# Patient Record
Sex: FEMALE | Race: WHITE | NOT HISPANIC OR LATINO
[De-identification: names, ages, dates, MRNs, and addresses within clinical notes are randomized per-mention and may not be internally consistent; named-entity substitution may affect disease eponyms.]

---

## 2017-01-26 ENCOUNTER — TRANSCRIPTION ENCOUNTER (OUTPATIENT)
Age: 57
End: 2017-01-26

## 2017-11-17 ENCOUNTER — TRANSCRIPTION ENCOUNTER (OUTPATIENT)
Age: 57
End: 2017-11-17

## 2017-11-28 ENCOUNTER — TRANSCRIPTION ENCOUNTER (OUTPATIENT)
Age: 57
End: 2017-11-28

## 2018-03-12 ENCOUNTER — TRANSCRIPTION ENCOUNTER (OUTPATIENT)
Age: 58
End: 2018-03-12

## 2018-04-15 ENCOUNTER — EMERGENCY (EMERGENCY)
Facility: HOSPITAL | Age: 58
LOS: 0 days | Discharge: HOME | End: 2018-04-15
Attending: EMERGENCY MEDICINE

## 2018-04-15 VITALS
SYSTOLIC BLOOD PRESSURE: 132 MMHG | RESPIRATION RATE: 18 BRPM | OXYGEN SATURATION: 99 % | TEMPERATURE: 96 F | DIASTOLIC BLOOD PRESSURE: 69 MMHG | HEART RATE: 42 BPM

## 2018-04-15 VITALS
RESPIRATION RATE: 20 BRPM | OXYGEN SATURATION: 99 % | HEART RATE: 52 BPM | TEMPERATURE: 96 F | DIASTOLIC BLOOD PRESSURE: 72 MMHG | SYSTOLIC BLOOD PRESSURE: 148 MMHG

## 2018-04-15 DIAGNOSIS — R10.9 UNSPECIFIED ABDOMINAL PAIN: ICD-10-CM

## 2018-04-15 DIAGNOSIS — Z90.89 ACQUIRED ABSENCE OF OTHER ORGANS: ICD-10-CM

## 2018-04-15 DIAGNOSIS — R10.33 PERIUMBILICAL PAIN: ICD-10-CM

## 2018-04-15 DIAGNOSIS — F41.9 ANXIETY DISORDER, UNSPECIFIED: ICD-10-CM

## 2018-04-15 DIAGNOSIS — K29.80 DUODENITIS WITHOUT BLEEDING: ICD-10-CM

## 2018-04-15 LAB
ALBUMIN SERPL ELPH-MCNC: 4.5 G/DL — SIGNIFICANT CHANGE UP (ref 3.5–5.2)
ALP SERPL-CCNC: 107 U/L — SIGNIFICANT CHANGE UP (ref 30–115)
ALT FLD-CCNC: 11 U/L — SIGNIFICANT CHANGE UP (ref 0–41)
ANION GAP SERPL CALC-SCNC: 12 MMOL/L — SIGNIFICANT CHANGE UP (ref 7–14)
AST SERPL-CCNC: 11 U/L — SIGNIFICANT CHANGE UP (ref 0–41)
BILIRUB SERPL-MCNC: 0.6 MG/DL — SIGNIFICANT CHANGE UP (ref 0.2–1.2)
BUN SERPL-MCNC: 16 MG/DL — SIGNIFICANT CHANGE UP (ref 10–20)
CALCIUM SERPL-MCNC: 9.3 MG/DL — SIGNIFICANT CHANGE UP (ref 8.5–10.1)
CHLORIDE SERPL-SCNC: 105 MMOL/L — SIGNIFICANT CHANGE UP (ref 98–110)
CO2 SERPL-SCNC: 27 MMOL/L — SIGNIFICANT CHANGE UP (ref 17–32)
CREAT SERPL-MCNC: 0.6 MG/DL — LOW (ref 0.7–1.5)
GAS PNL BLDV: SIGNIFICANT CHANGE UP
GLUCOSE SERPL-MCNC: 95 MG/DL — SIGNIFICANT CHANGE UP (ref 70–99)
HCT VFR BLD CALC: 42.8 % — SIGNIFICANT CHANGE UP (ref 37–47)
HGB BLD-MCNC: 14 G/DL — SIGNIFICANT CHANGE UP (ref 12–16)
LIDOCAIN IGE QN: 21 U/L — SIGNIFICANT CHANGE UP (ref 7–60)
MCHC RBC-ENTMCNC: 27.6 PG — SIGNIFICANT CHANGE UP (ref 27–31)
MCHC RBC-ENTMCNC: 32.7 G/DL — SIGNIFICANT CHANGE UP (ref 32–37)
MCV RBC AUTO: 84.4 FL — SIGNIFICANT CHANGE UP (ref 81–99)
NRBC # BLD: 0 /100 WBCS — SIGNIFICANT CHANGE UP (ref 0–0)
PLATELET # BLD AUTO: 327 K/UL — SIGNIFICANT CHANGE UP (ref 130–400)
POTASSIUM SERPL-MCNC: 4.7 MMOL/L — SIGNIFICANT CHANGE UP (ref 3.5–5)
POTASSIUM SERPL-SCNC: 4.7 MMOL/L — SIGNIFICANT CHANGE UP (ref 3.5–5)
PROT SERPL-MCNC: 7.1 G/DL — SIGNIFICANT CHANGE UP (ref 6–8)
RBC # BLD: 5.07 M/UL — SIGNIFICANT CHANGE UP (ref 4.2–5.4)
RBC # FLD: 12.9 % — SIGNIFICANT CHANGE UP (ref 11.5–14.5)
SODIUM SERPL-SCNC: 144 MMOL/L — SIGNIFICANT CHANGE UP (ref 135–146)
TROPONIN T SERPL-MCNC: <0.01 NG/ML — SIGNIFICANT CHANGE UP
WBC # BLD: 7.44 K/UL — SIGNIFICANT CHANGE UP (ref 4.8–10.8)
WBC # FLD AUTO: 7.44 K/UL — SIGNIFICANT CHANGE UP (ref 4.8–10.8)

## 2018-04-15 RX ORDER — PANTOPRAZOLE SODIUM 20 MG/1
1 TABLET, DELAYED RELEASE ORAL
Qty: 14 | Refills: 0
Start: 2018-04-15 | End: 2018-04-28

## 2018-04-15 RX ORDER — SODIUM CHLORIDE 9 MG/ML
1000 INJECTION INTRAMUSCULAR; INTRAVENOUS; SUBCUTANEOUS ONCE
Qty: 0 | Refills: 0 | Status: COMPLETED | OUTPATIENT
Start: 2018-04-15 | End: 2018-04-15

## 2018-04-15 RX ORDER — MORPHINE SULFATE 50 MG/1
4 CAPSULE, EXTENDED RELEASE ORAL ONCE
Qty: 0 | Refills: 0 | Status: DISCONTINUED | OUTPATIENT
Start: 2018-04-15 | End: 2018-04-15

## 2018-04-15 RX ORDER — FAMOTIDINE 10 MG/ML
20 INJECTION INTRAVENOUS ONCE
Qty: 0 | Refills: 0 | Status: DISCONTINUED | OUTPATIENT
Start: 2018-04-15 | End: 2018-04-15

## 2018-04-15 RX ORDER — FAMOTIDINE 10 MG/ML
20 INJECTION INTRAVENOUS ONCE
Qty: 0 | Refills: 0 | Status: COMPLETED | OUTPATIENT
Start: 2018-04-15 | End: 2018-04-15

## 2018-04-15 RX ORDER — METRONIDAZOLE 500 MG
500 TABLET ORAL ONCE
Qty: 0 | Refills: 0 | Status: COMPLETED | OUTPATIENT
Start: 2018-04-15 | End: 2018-04-15

## 2018-04-15 RX ORDER — ONDANSETRON 8 MG/1
4 TABLET, FILM COATED ORAL ONCE
Qty: 0 | Refills: 0 | Status: COMPLETED | OUTPATIENT
Start: 2018-04-15 | End: 2018-04-15

## 2018-04-15 RX ORDER — CIPROFLOXACIN LACTATE 400MG/40ML
400 VIAL (ML) INTRAVENOUS ONCE
Qty: 0 | Refills: 0 | Status: COMPLETED | OUTPATIENT
Start: 2018-04-15 | End: 2018-04-15

## 2018-04-15 RX ADMIN — SODIUM CHLORIDE 1000 MILLILITER(S): 9 INJECTION INTRAMUSCULAR; INTRAVENOUS; SUBCUTANEOUS at 13:58

## 2018-04-15 RX ADMIN — ONDANSETRON 4 MILLIGRAM(S): 8 TABLET, FILM COATED ORAL at 13:58

## 2018-04-15 RX ADMIN — MORPHINE SULFATE 4 MILLIGRAM(S): 50 CAPSULE, EXTENDED RELEASE ORAL at 13:58

## 2018-04-15 RX ADMIN — FAMOTIDINE 20 MILLIGRAM(S): 10 INJECTION INTRAVENOUS at 16:14

## 2018-04-15 RX ADMIN — Medication 200 MILLIGRAM(S): at 19:48

## 2018-04-15 RX ADMIN — Medication 100 MILLIGRAM(S): at 19:48

## 2018-04-15 NOTE — ED ADULT NURSE NOTE - RN DISCHARGE SIGNATURE
Problem: Patient Care Overview  Goal: Plan of Care Review  Outcome: Ongoing (interventions implemented as appropriate)  Patient resting in bed. Pain well controlled with epidural anesthesia. Membranes intact. Family at bedside.       15-Apr-2018

## 2018-04-15 NOTE — ED PROVIDER NOTE - PHYSICAL EXAMINATION
Well appearing NAD non toxic. NCAT EOMI conjunctiva nml. No nasal discharge. MMM. Neck supple, non tender, full ROM. RRR no MRG +S1S2. CTA b/l. Abd s +periumbilical ttp no rebound no guarding ND +BS. Ext WWP x4, moving all extremities, no edema. 2+ equal pulses throughout. Cooperative, appropriate. CN2-12 grossly intact no sensory or motor deficits throughout, no no drift, rapid alternating wnl, no ataxia, neg romberg.

## 2018-04-15 NOTE — ED PROVIDER NOTE - ATTENDING CONTRIBUTION TO CARE
56 y/o F pmh as noted, p/w epigastric pain, intermittent x 6d, w/ some vom and loose stool.  Pain worse this AM so came to ED.  NO melany cp, sob, back pain.  No hx dvt/ PE/ MI.  No prior abd surgery.  PE: NAD; NCAT; CTAB; RRR; abd soft + ttp epigastrum, no r/g; no cvat; skin warm dry; no pedal edema.  IMP: hepato-mario alberto vs gastroenteritis; acs/ thoracic pathology seems unlikely.  P: labs, ekg, cxr, CT abd, analgesia/ GI meds, reassess.

## 2018-04-15 NOTE — ED PROVIDER NOTE - OBJECTIVE STATEMENT
56yo F hx thyroidectomy anxiety otherwise healthy p/w abdominal pain x6d- intermittent, mid abdominal- non radiating- this AM, pain became constant, sig worse, +nausea, +loose stools- no f/c/v, chest pain, shortness of breath, dysuria/hematuria, back pain, numbness/weakness, hx abdominal surgeries

## 2018-04-15 NOTE — ED PROVIDER NOTE - CARE PLAN
Principal Discharge DX:	Duodenitis Principal Discharge DX:	Duodenitis  Secondary Diagnosis:	Abdominal pain

## 2018-04-15 NOTE — ED PROVIDER NOTE - PROGRESS NOTE DETAILS
Labs, imaging reviewed. Discussed results with patient. Will give pt rx for protonix and GI followup. Pt signed out to me pending completion of work up.  Pt  presenting with intermittent mid abd pain x6 days.  No a/w fever chills CP dysuria.  On Exam:  NAD.  Tolerating PO.  CT showing mild duodenitis.  Labs unremarkable will DC to follow with GI and give PPI.

## 2018-05-09 ENCOUNTER — EMERGENCY (EMERGENCY)
Facility: HOSPITAL | Age: 58
LOS: 0 days | Discharge: HOME | End: 2018-05-09
Attending: EMERGENCY MEDICINE | Admitting: EMERGENCY MEDICINE

## 2018-05-09 VITALS
DIASTOLIC BLOOD PRESSURE: 66 MMHG | OXYGEN SATURATION: 96 % | HEART RATE: 51 BPM | TEMPERATURE: 96 F | SYSTOLIC BLOOD PRESSURE: 138 MMHG | RESPIRATION RATE: 18 BRPM

## 2018-05-09 DIAGNOSIS — R10.13 EPIGASTRIC PAIN: ICD-10-CM

## 2018-05-09 DIAGNOSIS — K29.80 DUODENITIS WITHOUT BLEEDING: ICD-10-CM

## 2018-05-09 DIAGNOSIS — Z79.899 OTHER LONG TERM (CURRENT) DRUG THERAPY: ICD-10-CM

## 2018-05-09 DIAGNOSIS — E89.0 POSTPROCEDURAL HYPOTHYROIDISM: Chronic | ICD-10-CM

## 2018-05-09 DIAGNOSIS — Z87.891 PERSONAL HISTORY OF NICOTINE DEPENDENCE: ICD-10-CM

## 2018-05-09 LAB
ALBUMIN SERPL ELPH-MCNC: 4.5 G/DL — SIGNIFICANT CHANGE UP (ref 3.5–5.2)
ALP SERPL-CCNC: 90 U/L — SIGNIFICANT CHANGE UP (ref 30–115)
ALT FLD-CCNC: 13 U/L — SIGNIFICANT CHANGE UP (ref 0–41)
ANION GAP SERPL CALC-SCNC: 16 MMOL/L — HIGH (ref 7–14)
APPEARANCE UR: (no result)
AST SERPL-CCNC: 13 U/L — SIGNIFICANT CHANGE UP (ref 0–41)
BACTERIA # UR AUTO: (no result) /HPF
BASOPHILS # BLD AUTO: 0.06 K/UL — SIGNIFICANT CHANGE UP (ref 0–0.2)
BASOPHILS NFR BLD AUTO: 0.8 % — SIGNIFICANT CHANGE UP (ref 0–1)
BILIRUB SERPL-MCNC: 0.6 MG/DL — SIGNIFICANT CHANGE UP (ref 0.2–1.2)
BILIRUB UR-MCNC: (no result)
BUN SERPL-MCNC: 13 MG/DL — SIGNIFICANT CHANGE UP (ref 10–20)
CALCIUM SERPL-MCNC: 9.7 MG/DL — SIGNIFICANT CHANGE UP (ref 8.5–10.1)
CHLORIDE SERPL-SCNC: 100 MMOL/L — SIGNIFICANT CHANGE UP (ref 98–110)
CO2 SERPL-SCNC: 26 MMOL/L — SIGNIFICANT CHANGE UP (ref 17–32)
COLOR SPEC: SIGNIFICANT CHANGE UP
CREAT SERPL-MCNC: 0.8 MG/DL — SIGNIFICANT CHANGE UP (ref 0.7–1.5)
DIFF PNL FLD: NEGATIVE — SIGNIFICANT CHANGE UP
EOSINOPHIL # BLD AUTO: 0.25 K/UL — SIGNIFICANT CHANGE UP (ref 0–0.7)
EOSINOPHIL NFR BLD AUTO: 3.3 % — SIGNIFICANT CHANGE UP (ref 0–8)
EPI CELLS # UR: (no result) /HPF
GLUCOSE SERPL-MCNC: 90 MG/DL — SIGNIFICANT CHANGE UP (ref 70–99)
GLUCOSE UR QL: NEGATIVE — SIGNIFICANT CHANGE UP
HCT VFR BLD CALC: 42.5 % — SIGNIFICANT CHANGE UP (ref 37–47)
HGB BLD-MCNC: 13.8 G/DL — SIGNIFICANT CHANGE UP (ref 12–16)
IMM GRANULOCYTES NFR BLD AUTO: 0.1 % — SIGNIFICANT CHANGE UP (ref 0.1–0.3)
KETONES UR-MCNC: 15
LACTATE SERPL-SCNC: 0.8 MMOL/L — SIGNIFICANT CHANGE UP (ref 0.5–2.2)
LEUKOCYTE ESTERASE UR-ACNC: (no result)
LIDOCAIN IGE QN: 28 U/L — SIGNIFICANT CHANGE UP (ref 7–60)
LYMPHOCYTES # BLD AUTO: 3.04 K/UL — SIGNIFICANT CHANGE UP (ref 1.2–3.4)
LYMPHOCYTES # BLD AUTO: 40.1 % — SIGNIFICANT CHANGE UP (ref 20.5–51.1)
MCHC RBC-ENTMCNC: 27.9 PG — SIGNIFICANT CHANGE UP (ref 27–31)
MCHC RBC-ENTMCNC: 32.5 G/DL — SIGNIFICANT CHANGE UP (ref 32–37)
MCV RBC AUTO: 85.9 FL — SIGNIFICANT CHANGE UP (ref 81–99)
MONOCYTES # BLD AUTO: 0.53 K/UL — SIGNIFICANT CHANGE UP (ref 0.1–0.6)
MONOCYTES NFR BLD AUTO: 7 % — SIGNIFICANT CHANGE UP (ref 1.7–9.3)
NEUTROPHILS # BLD AUTO: 3.69 K/UL — SIGNIFICANT CHANGE UP (ref 1.4–6.5)
NEUTROPHILS NFR BLD AUTO: 48.7 % — SIGNIFICANT CHANGE UP (ref 42.2–75.2)
NITRITE UR-MCNC: NEGATIVE — SIGNIFICANT CHANGE UP
NRBC # BLD: 0 /100 WBCS — SIGNIFICANT CHANGE UP (ref 0–0)
PH UR: 5.5 — SIGNIFICANT CHANGE UP (ref 5–8)
PLATELET # BLD AUTO: 293 K/UL — SIGNIFICANT CHANGE UP (ref 130–400)
POTASSIUM SERPL-MCNC: 4.7 MMOL/L — SIGNIFICANT CHANGE UP (ref 3.5–5)
POTASSIUM SERPL-SCNC: 4.7 MMOL/L — SIGNIFICANT CHANGE UP (ref 3.5–5)
PROT SERPL-MCNC: 7.1 G/DL — SIGNIFICANT CHANGE UP (ref 6–8)
PROT UR-MCNC: 30
RBC # BLD: 4.95 M/UL — SIGNIFICANT CHANGE UP (ref 4.2–5.4)
RBC # FLD: 13.3 % — SIGNIFICANT CHANGE UP (ref 11.5–14.5)
SODIUM SERPL-SCNC: 142 MMOL/L — SIGNIFICANT CHANGE UP (ref 135–146)
SP GR SPEC: >=1.03 — SIGNIFICANT CHANGE UP (ref 1.01–1.03)
UROBILINOGEN FLD QL: 0.2 — SIGNIFICANT CHANGE UP (ref 0.2–0.2)
WBC # BLD: 7.58 K/UL — SIGNIFICANT CHANGE UP (ref 4.8–10.8)
WBC # FLD AUTO: 7.58 K/UL — SIGNIFICANT CHANGE UP (ref 4.8–10.8)
WBC UR QL: (no result) /HPF

## 2018-05-09 RX ORDER — PANTOPRAZOLE SODIUM 20 MG/1
40 TABLET, DELAYED RELEASE ORAL ONCE
Qty: 0 | Refills: 0 | Status: COMPLETED | OUTPATIENT
Start: 2018-05-09 | End: 2018-05-09

## 2018-05-09 RX ORDER — PANTOPRAZOLE SODIUM 20 MG/1
1 TABLET, DELAYED RELEASE ORAL
Qty: 30 | Refills: 0
Start: 2018-05-09 | End: 2018-06-07

## 2018-05-09 RX ORDER — LIDOCAINE 4 G/100G
10 CREAM TOPICAL ONCE
Qty: 0 | Refills: 0 | Status: COMPLETED | OUTPATIENT
Start: 2018-05-09 | End: 2018-05-09

## 2018-05-09 RX ORDER — SODIUM CHLORIDE 9 MG/ML
3 INJECTION INTRAMUSCULAR; INTRAVENOUS; SUBCUTANEOUS ONCE
Qty: 0 | Refills: 0 | Status: COMPLETED | OUTPATIENT
Start: 2018-05-09 | End: 2018-05-09

## 2018-05-09 RX ADMIN — Medication 30 MILLILITER(S): at 19:25

## 2018-05-09 RX ADMIN — PANTOPRAZOLE SODIUM 40 MILLIGRAM(S): 20 TABLET, DELAYED RELEASE ORAL at 20:10

## 2018-05-09 RX ADMIN — SODIUM CHLORIDE 3 MILLILITER(S): 9 INJECTION INTRAMUSCULAR; INTRAVENOUS; SUBCUTANEOUS at 19:25

## 2018-05-09 RX ADMIN — LIDOCAINE 10 MILLILITER(S): 4 CREAM TOPICAL at 20:17

## 2018-05-09 NOTE — ED PROVIDER NOTE - PHYSICAL EXAMINATION
VSS, awake, alert, non-toxic appearing, appears uncomfortable, ears clear, no scleral icterus, oropharynx clear, mmm, no JVD or bruit, no jaundice, skin rash or lesions, chest CTAB, non-labored breathing, no w/r/r, +S1/S2, RRR, no m/r/g, abdomen soft, epig ttp w/o peritoneal signs, ND, +BS, no hernias or distention, no pulsatile masses or bruits appreciated, no CVA tenderness, no peripheral edema or deformities, alert, clear speech and steady gait.

## 2018-05-09 NOTE — ED PROVIDER NOTE - PROGRESS NOTE DETAILS
pain improved, pt recently dx with duodenitis which responded to protonix and returned after completing a 10 day course, however was unable to f/u with GI, again responding to protonix in the ed, labs, ua, xr wnl, d/w pt, will not re-image at this time, rec continue protonix until f/u with gi, must f/u gi without fail. The patient was advised to return to the emergency department in 2-3 days if not continuing to improve or sooner if any new symptoms developed, symptoms worsened or for any concerns. The patient was offered the opportunity to ask questions and verbalized that they understand the diagnosis and discharge instructions.

## 2018-05-09 NOTE — ED ADULT NURSE NOTE - CHIEF COMPLAINT QUOTE
Had recent intestinal infection, finished abx approx two weeks ago, today woke up with nausea, a little diarrhea and abd pain

## 2018-05-09 NOTE — ED PROVIDER NOTE - OBJECTIVE STATEMENT
57-year-old female history of anxiety, thyroid CA status post thyroidectomy, BTL, treated 2 weeks ago at [facility] with Protonix ×10 days for the diagnosis of duodenitis as found on CT scan, patient reports she felt better after 2 days of Protonix and discontinued the medication after 10 days, she reports that the pain returned today, similar to the pain she experienced previously. The pain is located in the epigastric region, is constant, denies radiation, denies modifying factors, associated nausea without vomiting, last bowel movements was today ago, passing stool and flatus, denies fever, diarrhea, respiratory sx, change in bowel habits or urinary sx, vaginal d/c or other associated complaints at present.

## 2018-09-01 ENCOUNTER — EMERGENCY (EMERGENCY)
Facility: HOSPITAL | Age: 58
LOS: 0 days | Discharge: HOME | End: 2018-09-01
Admitting: PHYSICIAN ASSISTANT

## 2018-09-01 VITALS
TEMPERATURE: 98 F | HEART RATE: 60 BPM | OXYGEN SATURATION: 95 % | RESPIRATION RATE: 17 BRPM | SYSTOLIC BLOOD PRESSURE: 140 MMHG | DIASTOLIC BLOOD PRESSURE: 60 MMHG

## 2018-09-01 DIAGNOSIS — S50.312A ABRASION OF LEFT ELBOW, INITIAL ENCOUNTER: ICD-10-CM

## 2018-09-01 DIAGNOSIS — K21.9 GASTRO-ESOPHAGEAL REFLUX DISEASE WITHOUT ESOPHAGITIS: ICD-10-CM

## 2018-09-01 DIAGNOSIS — E89.0 POSTPROCEDURAL HYPOTHYROIDISM: Chronic | ICD-10-CM

## 2018-09-01 DIAGNOSIS — S69.92XA UNSPECIFIED INJURY OF LEFT WRIST, HAND AND FINGER(S), INITIAL ENCOUNTER: ICD-10-CM

## 2018-09-01 DIAGNOSIS — Z98.890 OTHER SPECIFIED POSTPROCEDURAL STATES: ICD-10-CM
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
## 2018-09-01 DIAGNOSIS — Z79.899 OTHER LONG TERM (CURRENT) DRUG THERAPY: ICD-10-CM
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
## 2018-09-01 NOTE — ED PROVIDER NOTE - MUSCULOSKELETAL, MLM
no obvious deformity to left upper extremity ; + mild tenderness along ulnar aspect of left wrist with full ROM exhibited ; + tenderness along left lateral epicondyle with full ROM exhibited ; no shoulder tenderness ; Spine appears normal

## 2018-09-01 NOTE — ED PROVIDER NOTE - MEDICAL DECISION MAKING DETAILS
I have discussed the discharge plan with the patient. The patient agrees with the plan, as discussed.  The patient understands Emergency Department diagnosis is a preliminary diagnosis often based on limited information and that the patient must adhere to the follow-up plan as discussed.  The patient understands that if the symptoms worsen or if prescribed medications do not have the desired/planned effect that the patient may return to the Emergency Department at any time for further evaluation and treatment. I have discussed the discharge plan with the patient. The patient agrees with the plan, as discussed.  The patient understands Emergency Department diagnosis is a preliminary diagnosis often based on limited information and that the patient must adhere to the follow-up plan as discussed.  The patient understands that if the symptoms worsen or if prescribed medications do not have the desired/planned effect that the patient may return to the Emergency Department at any time for further evaluation and treatment.  Orthopedic f/u recommended.
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
## 2018-09-01 NOTE — ED PROCEDURE NOTE - NS ED PERI NEURO NEG
The patient/caregiver verbalized understanding of how to care for the injured extremity with splint/Pre-application: Motor, sensory, and vascular responses intact in the injured extremity./Post-application: Motor, sensory, and vascular responses intact in the injured extremity.
Post-application: Motor, sensory, and vascular responses intact in the injured extremity./Pre-application: Motor, sensory, and vascular responses intact in the injured extremity./The patient/caregiver verbalized understanding of how to care for the injured extremity with splint

## 2018-09-01 NOTE — ED PROCEDURE NOTE - CPROC ED POST PROC CARE GUIDE1
Keep the cast/splint/dressing clean and dry./Instructed patient/caregiver to follow-up with primary care physician./Instructed patient/caregiver regarding signs and symptoms of infection./Verbal/written post procedure instructions were given to patient/caregiver./Elevate the injured extremity as instructed.
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
## 2018-10-22 ENCOUNTER — TRANSCRIPTION ENCOUNTER (OUTPATIENT)
Age: 58
End: 2018-10-22

## 2018-12-11 ENCOUNTER — TRANSCRIPTION ENCOUNTER (OUTPATIENT)
Age: 58
End: 2018-12-11

## 2019-06-10 ENCOUNTER — TRANSCRIPTION ENCOUNTER (OUTPATIENT)
Age: 59
End: 2019-06-10

## 2019-09-17 ENCOUNTER — EMERGENCY (EMERGENCY)
Facility: HOSPITAL | Age: 59
LOS: 0 days | Discharge: HOME | End: 2019-09-17
Attending: EMERGENCY MEDICINE | Admitting: EMERGENCY MEDICINE
Payer: MEDICAID

## 2019-09-17 ENCOUNTER — TRANSCRIPTION ENCOUNTER (OUTPATIENT)
Age: 59
End: 2019-09-17

## 2019-09-17 VITALS
HEART RATE: 55 BPM | TEMPERATURE: 98 F | DIASTOLIC BLOOD PRESSURE: 87 MMHG | SYSTOLIC BLOOD PRESSURE: 168 MMHG | RESPIRATION RATE: 18 BRPM | OXYGEN SATURATION: 100 %

## 2019-09-17 VITALS
TEMPERATURE: 98 F | OXYGEN SATURATION: 100 % | SYSTOLIC BLOOD PRESSURE: 142 MMHG | RESPIRATION RATE: 18 BRPM | DIASTOLIC BLOOD PRESSURE: 92 MMHG | HEART RATE: 66 BPM

## 2019-09-17 DIAGNOSIS — E89.0 POSTPROCEDURAL HYPOTHYROIDISM: Chronic | ICD-10-CM

## 2019-09-17 DIAGNOSIS — R05 COUGH: ICD-10-CM

## 2019-09-17 DIAGNOSIS — R06.02 SHORTNESS OF BREATH: ICD-10-CM

## 2019-09-17 DIAGNOSIS — Z87.891 PERSONAL HISTORY OF NICOTINE DEPENDENCE: ICD-10-CM

## 2019-09-17 LAB
ALBUMIN SERPL ELPH-MCNC: 4.6 G/DL — SIGNIFICANT CHANGE UP (ref 3.5–5.2)
ALP SERPL-CCNC: 88 U/L — SIGNIFICANT CHANGE UP (ref 30–115)
ALT FLD-CCNC: 10 U/L — SIGNIFICANT CHANGE UP (ref 0–41)
ANION GAP SERPL CALC-SCNC: 12 MMOL/L — SIGNIFICANT CHANGE UP (ref 7–14)
AST SERPL-CCNC: 9 U/L — SIGNIFICANT CHANGE UP (ref 0–41)
BILIRUB SERPL-MCNC: 0.3 MG/DL — SIGNIFICANT CHANGE UP (ref 0.2–1.2)
BUN SERPL-MCNC: 21 MG/DL — HIGH (ref 10–20)
CALCIUM SERPL-MCNC: 9.3 MG/DL — SIGNIFICANT CHANGE UP (ref 8.5–10.1)
CHLORIDE SERPL-SCNC: 107 MMOL/L — SIGNIFICANT CHANGE UP (ref 98–110)
CO2 SERPL-SCNC: 24 MMOL/L — SIGNIFICANT CHANGE UP (ref 17–32)
CREAT SERPL-MCNC: 0.7 MG/DL — SIGNIFICANT CHANGE UP (ref 0.7–1.5)
D DIMER BLD IA.RAPID-MCNC: 91 NG/ML DDU — SIGNIFICANT CHANGE UP (ref 0–230)
GLUCOSE SERPL-MCNC: 96 MG/DL — SIGNIFICANT CHANGE UP (ref 70–99)
HCT VFR BLD CALC: 41.5 % — SIGNIFICANT CHANGE UP (ref 37–47)
HGB BLD-MCNC: 13.3 G/DL — SIGNIFICANT CHANGE UP (ref 12–16)
MCHC RBC-ENTMCNC: 28.7 PG — SIGNIFICANT CHANGE UP (ref 27–31)
MCHC RBC-ENTMCNC: 32 G/DL — SIGNIFICANT CHANGE UP (ref 32–37)
MCV RBC AUTO: 89.6 FL — SIGNIFICANT CHANGE UP (ref 81–99)
NRBC # BLD: 0 /100 WBCS — SIGNIFICANT CHANGE UP (ref 0–0)
PLATELET # BLD AUTO: 303 K/UL — SIGNIFICANT CHANGE UP (ref 130–400)
POTASSIUM SERPL-MCNC: 4.1 MMOL/L — SIGNIFICANT CHANGE UP (ref 3.5–5)
POTASSIUM SERPL-SCNC: 4.1 MMOL/L — SIGNIFICANT CHANGE UP (ref 3.5–5)
PROT SERPL-MCNC: 7 G/DL — SIGNIFICANT CHANGE UP (ref 6–8)
RBC # BLD: 4.63 M/UL — SIGNIFICANT CHANGE UP (ref 4.2–5.4)
RBC # FLD: 12.7 % — SIGNIFICANT CHANGE UP (ref 11.5–14.5)
SODIUM SERPL-SCNC: 143 MMOL/L — SIGNIFICANT CHANGE UP (ref 135–146)
TROPONIN T SERPL-MCNC: <0.01 NG/ML — SIGNIFICANT CHANGE UP
WBC # BLD: 7.9 K/UL — SIGNIFICANT CHANGE UP (ref 4.8–10.8)
WBC # FLD AUTO: 7.9 K/UL — SIGNIFICANT CHANGE UP (ref 4.8–10.8)

## 2019-09-17 PROCEDURE — 93010 ELECTROCARDIOGRAM REPORT: CPT

## 2019-09-17 PROCEDURE — 99284 EMERGENCY DEPT VISIT MOD MDM: CPT

## 2019-09-17 NOTE — ED ADULT NURSE NOTE - CHPI ED NUR SYMPTOMS NEG
no hemoptysis/no edema/no fever/no headache/no cough/no diaphoresis/no body aches/no chills/no chest pain

## 2019-09-17 NOTE — ED PROVIDER NOTE - CLINICAL SUMMARY MEDICAL DECISION MAKING FREE TEXT BOX
Labs, EKG, CXR ok. Pt comfortable sitting in chair. Daughter feels strongly that sx are related to anxiety; pt not so sure. Offered CDU for further workup with echo/stress/CTA, however pt refused; daughter arranged cardiology f/u next week. Will d/c with return precautions.

## 2019-09-17 NOTE — ED PROVIDER NOTE - NS ED ROS FT
Constitutional:  see HPI  Head:  no headache, dizziness, loss of consciousness  Eyes:  no visual changes; no eye pain, redness, or discharge  ENMT:  no ear pain or discharge; no hearing problems; no mouth or throat sores or lesions; no throat pain  Cardiac: no chest pain, tachycardia or palpitations  Respiratory: see HPI  GI: no nausea, vomiting, diarrhea or abdominal pain  :  no dysuria, frequency, or burning with urination; no change in urine output  MS: no myalgias, muscle weakness, joint pain,or  injury; no joint swelling  Neuro: no weakness; no numbness or tingling; no seizure  Skin:  no rashes or color changes; no lacerations or abrasions

## 2019-09-17 NOTE — ED PROVIDER NOTE - ATTENDING CONTRIBUTION TO CARE
57yo woman, ex smoker, thyroid CA s/p resection 2 years ago, anxiety c/o SOB that began yesterday without clear precipitant. Sx wax and wane, and pt reports that she feels like she can't catch her breath, but every 4 or 5 breaths she can "take a deep one." No fever, chills, chest pain, back pain, leg pain, otherwise feels well. Sx are intermittent, no change with exertion. No PE risk factors other than the thyroid CA. Went to an [facility] and was told to come to the ED for evaluation. On my eval, pt looks comfortable though anxious, VS as noted. Normal work of breathing, speaks in complete sentences, lungs CTA, CVS1S2 bradycardic, abd soft, NT, no peripheral edema. When discussing differential, pt becomes notably more anxious and breathing speeds up; when she is speaking, she slows down markedly as well. Doubt CAD/PE as etiology but will check EKG, labs including trop and d-dimer.

## 2019-09-17 NOTE — ED PROVIDER NOTE - OBJECTIVE STATEMENT
58 year old female with PMHx thyroid carcinoma s/p resection 2 years ago, anxiety and is a former smoker, who presents to the ED for shortness of breath at rest which began yesterday morning and became worse last night. Walking up and down the stairs at home does not make her shortness of breath worse. She became concerned and went to urgent care today, who sent her to the ED for evaluation. Has never had similar symptoms before and this does not feel like her anxiety. She has had a non-productive cough for the past 1 month. Otherwise, patient denies any chest pain, palpitations, hemoptysis, leg edema, leg pain, fevers, chills, recent surgeries, recent travel, recent periods of immobilization, no h/o PE or DVT. 58 year old female with PMHx thyroid carcinoma s/p resection 2 years ago, anxiety and is a former smoker, who presents to the ED for shortness of breath at rest which began yesterday morning and became worse last night. Walking up and down the stairs at home does not make her shortness of breath worse. She became concerned and went to urgent care today, who sent her to the ED for evaluation. Has never had similar symptoms before and this does not feel like her anxiety. She has had a non-productive cough for the past 1 month. Otherwise, patient denies any chest pain, palpitations, diaphoresis, hemoptysis, nausea/vomiting, leg edema, leg pain, fevers, chills, recent surgeries, recent travel, recent periods of immobilization, no h/o PE or DVT.

## 2019-09-17 NOTE — ED PROVIDER NOTE - PHYSICAL EXAMINATION
CONSTITUTIONAL: Well-developed; well-nourished; in no acute distress. Speaking full sentences.  SKIN: warm, dry  HEAD: Normocephalic; atraumatic.  EYES: PERRL, EOMI, normal sclera and conjunctiva   ENT: No nasal discharge; airway clear. Pharynx: clear, no erythema, no exudate.   NECK: Supple; non tender.  CARD: S1, S2 normal; no murmurs, gallops, or rubs. Regular rate and rhythm.    RESP: No wheezes, rales or rhonchi. No accessory muscle use.  ABD: soft ntnd  EXT: Normal ROM.  No clubbing, cyanosis or edema.  No calf tenderness.   LYMPH: No acute cervical adenopathy.  NEURO: Alert, oriented, grossly unremarkable  PSYCH: Cooperative, appropriate.

## 2019-09-17 NOTE — ED PROVIDER NOTE - PATIENT PORTAL LINK FT
Dr. Avelar You can access the FollowMyHealth Patient Portal offered by [facility] by registering at the following website: [email or web address]. By joining [employer]’s FollowMyHealth portal, you will also be able to view your health information using other applications (apps) compatible with our system.

## 2020-09-08 ENCOUNTER — TRANSCRIPTION ENCOUNTER (OUTPATIENT)
Age: 60
End: 2020-09-08

## 2021-01-13 ENCOUNTER — TRANSCRIPTION ENCOUNTER (OUTPATIENT)
Age: 61
End: 2021-01-13

## 2021-01-13 ENCOUNTER — EMERGENCY (EMERGENCY)
Facility: HOSPITAL | Age: 61
LOS: 0 days | Discharge: HOME | End: 2021-01-13

## 2021-01-13 VITALS
OXYGEN SATURATION: 97 % | TEMPERATURE: 98 F | RESPIRATION RATE: 20 BRPM | DIASTOLIC BLOOD PRESSURE: 68 MMHG | HEART RATE: 75 BPM | WEIGHT: 199.96 LBS | SYSTOLIC BLOOD PRESSURE: 113 MMHG

## 2021-01-13 DIAGNOSIS — E89.0 POSTPROCEDURAL HYPOTHYROIDISM: Chronic | ICD-10-CM

## 2021-01-14 PROBLEM — Z85.850 PERSONAL HISTORY OF MALIGNANT NEOPLASM OF THYROID: Chronic | Status: ACTIVE | Noted: 2019-09-17

## 2021-01-14 PROBLEM — F41.9 ANXIETY DISORDER, UNSPECIFIED: Chronic | Status: ACTIVE | Noted: 2019-09-17

## 2021-03-30 ENCOUNTER — TRANSCRIPTION ENCOUNTER (OUTPATIENT)
Age: 61
End: 2021-03-30

## 2021-06-05 ENCOUNTER — EMERGENCY (EMERGENCY)
Facility: HOSPITAL | Age: 61
LOS: 0 days | Discharge: HOME | End: 2021-06-06
Attending: EMERGENCY MEDICINE | Admitting: PHYSICIAN ASSISTANT
Payer: MEDICAID

## 2021-06-05 VITALS
RESPIRATION RATE: 20 BRPM | HEIGHT: 65 IN | SYSTOLIC BLOOD PRESSURE: 115 MMHG | OXYGEN SATURATION: 96 % | TEMPERATURE: 98 F | HEART RATE: 73 BPM | WEIGHT: 175.93 LBS | DIASTOLIC BLOOD PRESSURE: 67 MMHG
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
## 2021-06-05 DIAGNOSIS — Z85.850 PERSONAL HISTORY OF MALIGNANT NEOPLASM OF THYROID: ICD-10-CM

## 2021-06-05 DIAGNOSIS — S09.90XA UNSPECIFIED INJURY OF HEAD, INITIAL ENCOUNTER: ICD-10-CM

## 2021-06-05 DIAGNOSIS — R00.1 BRADYCARDIA, UNSPECIFIED: ICD-10-CM

## 2021-06-05 DIAGNOSIS — S80.212A ABRASION, LEFT KNEE, INITIAL ENCOUNTER: ICD-10-CM

## 2021-06-05 DIAGNOSIS — F41.9 ANXIETY DISORDER, UNSPECIFIED: ICD-10-CM
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
## 2021-06-05 DIAGNOSIS — G43.909 MIGRAINE, UNSPECIFIED, NOT INTRACTABLE, WITHOUT STATUS MIGRAINOSUS: ICD-10-CM

## 2021-06-05 DIAGNOSIS — I10 ESSENTIAL (PRIMARY) HYPERTENSION: ICD-10-CM

## 2021-06-05 DIAGNOSIS — R55 SYNCOPE AND COLLAPSE: ICD-10-CM

## 2021-06-05 DIAGNOSIS — S00.511A ABRASION OF LIP, INITIAL ENCOUNTER: ICD-10-CM

## 2021-06-05 DIAGNOSIS — E89.0 POSTPROCEDURAL HYPOTHYROIDISM: Chronic | ICD-10-CM

## 2021-06-05 LAB
ALBUMIN SERPL ELPH-MCNC: 4.4 G/DL — SIGNIFICANT CHANGE UP (ref 3.5–5.2)
ALP SERPL-CCNC: 78 U/L — SIGNIFICANT CHANGE UP (ref 30–115)
ALT FLD-CCNC: 14 U/L — SIGNIFICANT CHANGE UP (ref 0–41)
ANION GAP SERPL CALC-SCNC: 12 MMOL/L — SIGNIFICANT CHANGE UP (ref 7–14)
AST SERPL-CCNC: 11 U/L — SIGNIFICANT CHANGE UP (ref 0–41)
BASOPHILS # BLD AUTO: 0.06 K/UL — SIGNIFICANT CHANGE UP (ref 0–0.2)
BASOPHILS NFR BLD AUTO: 0.6 % — SIGNIFICANT CHANGE UP (ref 0–1)
BILIRUB SERPL-MCNC: 0.4 MG/DL — SIGNIFICANT CHANGE UP (ref 0.2–1.2)
BUN SERPL-MCNC: 15 MG/DL — SIGNIFICANT CHANGE UP (ref 10–20)
CALCIUM SERPL-MCNC: 9.8 MG/DL — SIGNIFICANT CHANGE UP (ref 8.5–10.1)
CHLORIDE SERPL-SCNC: 103 MMOL/L — SIGNIFICANT CHANGE UP (ref 98–110)
CO2 SERPL-SCNC: 26 MMOL/L — SIGNIFICANT CHANGE UP (ref 17–32)
CREAT SERPL-MCNC: 0.8 MG/DL — SIGNIFICANT CHANGE UP (ref 0.7–1.5)
EOSINOPHIL # BLD AUTO: 0.21 K/UL — SIGNIFICANT CHANGE UP (ref 0–0.7)
EOSINOPHIL NFR BLD AUTO: 2.2 % — SIGNIFICANT CHANGE UP (ref 0–8)
GLUCOSE SERPL-MCNC: 107 MG/DL — HIGH (ref 70–99)
HCT VFR BLD CALC: 44.2 % — SIGNIFICANT CHANGE UP (ref 37–47)
HGB BLD-MCNC: 14.2 G/DL — SIGNIFICANT CHANGE UP (ref 12–16)
IMM GRANULOCYTES NFR BLD AUTO: 0.7 % — HIGH (ref 0.1–0.3)
LYMPHOCYTES # BLD AUTO: 2.62 K/UL — SIGNIFICANT CHANGE UP (ref 1.2–3.4)
LYMPHOCYTES # BLD AUTO: 26.9 % — SIGNIFICANT CHANGE UP (ref 20.5–51.1)
MAGNESIUM SERPL-MCNC: 2.2 MG/DL — SIGNIFICANT CHANGE UP (ref 1.8–2.4)
MCHC RBC-ENTMCNC: 28.5 PG — SIGNIFICANT CHANGE UP (ref 27–31)
MCHC RBC-ENTMCNC: 32.1 G/DL — SIGNIFICANT CHANGE UP (ref 32–37)
MCV RBC AUTO: 88.6 FL — SIGNIFICANT CHANGE UP (ref 81–99)
MONOCYTES # BLD AUTO: 0.72 K/UL — HIGH (ref 0.1–0.6)
MONOCYTES NFR BLD AUTO: 7.4 % — SIGNIFICANT CHANGE UP (ref 1.7–9.3)
NEUTROPHILS # BLD AUTO: 6.05 K/UL — SIGNIFICANT CHANGE UP (ref 1.4–6.5)
NEUTROPHILS NFR BLD AUTO: 62.2 % — SIGNIFICANT CHANGE UP (ref 42.2–75.2)
NRBC # BLD: 0 /100 WBCS — SIGNIFICANT CHANGE UP (ref 0–0)
PLATELET # BLD AUTO: 332 K/UL — SIGNIFICANT CHANGE UP (ref 130–400)
POTASSIUM SERPL-MCNC: 4 MMOL/L — SIGNIFICANT CHANGE UP (ref 3.5–5)
POTASSIUM SERPL-SCNC: 4 MMOL/L — SIGNIFICANT CHANGE UP (ref 3.5–5)
PROT SERPL-MCNC: 6.9 G/DL — SIGNIFICANT CHANGE UP (ref 6–8)
RBC # BLD: 4.99 M/UL — SIGNIFICANT CHANGE UP (ref 4.2–5.4)
RBC # FLD: 14.7 % — HIGH (ref 11.5–14.5)
SODIUM SERPL-SCNC: 141 MMOL/L — SIGNIFICANT CHANGE UP (ref 135–146)
TROPONIN T SERPL-MCNC: <0.01 NG/ML — SIGNIFICANT CHANGE UP
WBC # BLD: 9.73 K/UL — SIGNIFICANT CHANGE UP (ref 4.8–10.8)
WBC # FLD AUTO: 9.73 K/UL — SIGNIFICANT CHANGE UP (ref 4.8–10.8)

## 2021-06-05 PROCEDURE — 71045 X-RAY EXAM CHEST 1 VIEW: CPT | Mod: 26

## 2021-06-05 PROCEDURE — 93010 ELECTROCARDIOGRAM REPORT: CPT | Mod: 76

## 2021-06-05 PROCEDURE — 99220: CPT

## 2021-06-05 PROCEDURE — 99285 EMERGENCY DEPT VISIT HI MDM: CPT

## 2021-06-05 RX ORDER — SODIUM CHLORIDE 9 MG/ML
2000 INJECTION, SOLUTION INTRAVENOUS ONCE
Refills: 0 | Status: COMPLETED | OUTPATIENT
Start: 2021-06-05 | End: 2021-06-05

## 2021-06-05 RX ADMIN — SODIUM CHLORIDE 2000 MILLILITER(S): 9 INJECTION, SOLUTION INTRAVENOUS at 20:37

## 2021-06-05 NOTE — ED PROVIDER NOTE - NS ED ROS FT
Constitutional: No fever, chills.  Eyes: No visual changes.  ENT: No hearing changes. No sore throat.  Neck: No neck pain or stiffness.  Cardiovascular: No chest pain, palpitations, edema.  Pulmonary: No SOB, cough. No hemoptysis.  Abdominal: No abdominal pain, nausea, vomiting, diarrhea.  : No dysuria, frequency.  Neuro: No headache, dizziness. + syncope.  MS: No back pain. No calf pain/swelling.  Psych: No suicidal ideations.
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
## 2021-06-05 NOTE — ED PROVIDER NOTE - CLINICAL SUMMARY MEDICAL DECISION MAKING FREE TEXT BOX
Pt presented to ED for syncope. LAbs, EKG, imaging obtained and reviewed. No acute findings. will place in obs.
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
## 2021-06-06 VITALS
OXYGEN SATURATION: 96 % | HEART RATE: 61 BPM | RESPIRATION RATE: 18 BRPM | DIASTOLIC BLOOD PRESSURE: 92 MMHG | SYSTOLIC BLOOD PRESSURE: 190 MMHG

## 2021-06-06 LAB
SARS-COV-2 RNA SPEC QL NAA+PROBE: SIGNIFICANT CHANGE UP
TROPONIN T SERPL-MCNC: <0.01 NG/ML — SIGNIFICANT CHANGE UP

## 2021-06-06 PROCEDURE — 99217: CPT

## 2021-06-06 PROCEDURE — 93010 ELECTROCARDIOGRAM REPORT: CPT

## 2021-06-06 PROCEDURE — 93306 TTE W/DOPPLER COMPLETE: CPT | Mod: 26

## 2021-06-06 RX ORDER — METOCLOPRAMIDE HCL 10 MG
10 TABLET ORAL ONCE
Refills: 0 | Status: COMPLETED | OUTPATIENT
Start: 2021-06-06 | End: 2021-06-06

## 2021-06-06 RX ORDER — FLECAINIDE ACETATE 50 MG
25 TABLET ORAL
Qty: 0 | Refills: 0 | DISCHARGE

## 2021-06-06 RX ORDER — ACETAMINOPHEN 500 MG
975 TABLET ORAL ONCE
Refills: 0 | Status: COMPLETED | OUTPATIENT
Start: 2021-06-06 | End: 2021-06-06

## 2021-06-06 RX ORDER — KETOROLAC TROMETHAMINE 30 MG/ML
15 SYRINGE (ML) INJECTION ONCE
Refills: 0 | Status: DISCONTINUED | OUTPATIENT
Start: 2021-06-06 | End: 2021-06-06

## 2021-06-06 RX ORDER — DIAZEPAM 5 MG
0 TABLET ORAL
Qty: 0 | Refills: 0 | DISCHARGE

## 2021-06-06 RX ORDER — LOSARTAN POTASSIUM 100 MG/1
1 TABLET, FILM COATED ORAL
Qty: 0 | Refills: 0 | DISCHARGE

## 2021-06-06 RX ORDER — LEVOTHYROXINE SODIUM 125 MCG
120 TABLET ORAL
Qty: 0 | Refills: 0 | DISCHARGE

## 2021-06-06 RX ORDER — LEVOTHYROXINE SODIUM 125 MCG
0 TABLET ORAL
Qty: 0 | Refills: 0 | DISCHARGE

## 2021-06-06 RX ADMIN — Medication 10 MILLIGRAM(S): at 06:58

## 2021-06-06 RX ADMIN — Medication 975 MILLIGRAM(S): at 06:59

## 2021-06-06 RX ADMIN — Medication 15 MILLIGRAM(S): at 12:48

## 2021-06-06 NOTE — ED CDU PROVIDER INITIAL DAY NOTE - ATTENDING CONTRIBUTION TO CARE
Pt has a history of syncope. Followed by Dr. Vang of cardiology. Pt is no flecainide, however does not know indication. Prior to syncope yesterday pt did not have any symptoms. Fell and injured the lower lip. On exam left side lower lip is mildly swollen. Neuro intact. S1S2 celeste 55, lungs clear, no swelling to lower ext. Pt is in observation awaiting Echo. Pt has a history of syncope. Followed by Dr. Vang of cardiology. Pt is on flecainide, however does not know indication. Prior to syncope yesterday pt did not have any symptoms. Fell and injured the lower lip. On exam left side lower lip is mildly swollen. Neuro intact. S1S2 celeste 55, lungs clear, no swelling to lower ext. Pt is in observation awaiting Echo.

## 2021-06-06 NOTE — CONSULT NOTE ADULT - ASSESSMENT
EP: Dr. Vang    Pt is a 59 y/o female with hx of HTN, bradycardia, thyroid ca s/p resection, anxiety presents to ED for syncope.    Impression:  Syncope, likely orthostatic  Hx arryhtmia ? on flecanide and metoprolol    Plan:  - Encourage PO fluid intake 3-4L daily  - Offered ILR, will decline for now  - Check orthostatics  - COnt home meds  - Please fu as outpatient with Dr. Spann
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
## 2021-06-06 NOTE — ED CDU PROVIDER DISPOSITION NOTE - CLINICAL COURSE
Pt presented with syncope. Placed into observation for evaluation. While in observation pt was on cardiac monitoring. No events noted. CE neg. Echo no significant finding. Consultation with EP who advised outpt follow up for continued monitoring. Prolonged QTc discussed.

## 2021-06-06 NOTE — ED ADULT NURSE REASSESSMENT NOTE - NS ED NURSE REASSESS COMMENT FT1
Pt assessed A//O times 4 Vs stable remain comfortable denies chest pain  no SOB no N/V no dizziness ambulate steady breakfast tray provide did eat 75% is seen evaluate by Ed attending ready to  be transport to [facility] with transporter .

## 2021-06-06 NOTE — CONSULT NOTE ADULT - SUBJECTIVE AND OBJECTIVE BOX
Patient is a 60y old  Female who presents with a chief complaint of     HPI:  Pt is a 61 y/o female with hx of HTN, bradycardia, presents to ED for syncope that occurred PTA. Pt states after dinner and a couple of sangria drinks, was stepping out of car and had syncopal episode. No chest pain or headache PTA. Pt fall to ground, + head trauma. No seizure activity. Now only c/o mild abrasion to lower lip and left knee pain mild.    EP consulted for syncope.     REVIEW OF SYSTEMS    [ ] A ten-point review of systems was otherwise negative except as noted.  [ ] Due to altered mental status/intubation, subjective information were not able to be obtained from the patient. History was obtained, to the extent possible, from review of the chart and collateral sources of information.      PAST MEDICAL & SURGICAL HISTORY:  History of thyroid cancer  s/p resection 2 years ago    Anxiety    Syncope, unspecified syncope type    H/O thyroidectomy        Home Medications:  flecainide: 25 milligram(s) orally once a day (06 Jun 2021 02:00)  losartan 25 mg oral tablet: 1 tab(s) orally once a day (06 Jun 2021 02:00)  Synthroid: 120 milligram(s) orally once a day (06 Jun 2021 02:00)  Synthroid:  (06 Jun 2021 02:00)  Valium:  (06 Jun 2021 02:00)      Allergies:  No Known Allergies    FAMILY HISTORY:  No pertinent family history in first degree relatives      SOCIAL HISTORY:  CIGARETTES:  ALCOHOL:      MEDICATIONS  (STANDING):    MEDICATIONS  (PRN):      Vital Signs Last 24 Hrs  T(C): 36.2 (06 Jun 2021 07:30), Max: 36.6 (05 Jun 2021 19:46)  T(F): 97.2 (06 Jun 2021 07:30), Max: 97.8 (05 Jun 2021 19:46)  HR: 59 (06 Jun 2021 07:30) (56 - 73)  BP: 148/85 (06 Jun 2021 07:30) (115/67 - 165/80)  BP(mean): --  RR: 18 (06 Jun 2021 07:30) (18 - 20)  SpO2: 97% (06 Jun 2021 07:30) (96% - 99%)    PHYSICAL EXAM:    GENERAL: In no apparent distress, well nourished, and hydrated.  HEART: Regular rate and rhythm; No murmurs, rubs, or gallops.  PULMONARY: Clear to auscultation and perfusion.  No rales, wheezing, or rhonchi bilaterally.  ABDOMEN: Soft, Nontender, Nondistended; Bowel sounds present  EXTREMITIES:  2+ Peripheral Pulses, No clubbing, cyanosis, or edema  NEUROLOGICAL: Grossly nonfocal      INTERPRETATION OF TELEMETRY:     ECG: < from: 12 Lead ECG (06.06.21 @ 00:10) >  Ventricular Rate 56 BPM    Atrial Rate 56 BPM    P-R Interval 180 ms    QRS Duration 82 ms    Q-T Interval 510 ms    QTC Calculation(Bazett) 492 ms    P Axis 59 degrees    R Axis 6 degrees    T Axis 39 degrees    Diagnosis Line Sinus bradycardia    < end of copied text >      I&O's Detail      LABS:                        14.2   9.73  )-----------( 332      ( 05 Jun 2021 20:22 )             44.2     06-05    141  |  103  |  15  ----------------------------<  107<H>  4.0   |  26  |  0.8    Ca    9.8      05 Jun 2021 20:22  Mg     2.2     06-05    TPro  6.9  /  Alb  4.4  /  TBili  0.4  /  DBili  x   /  AST  11  /  ALT  14  /  AlkPhos  78  06-05    CARDIAC MARKERS ( 06 Jun 2021 00:30 )  x     / <0.01 ng/mL / x     / x     / x      CARDIAC MARKERS ( 05 Jun 2021 20:22 )  x     / <0.01 ng/mL / x     / x     / x        I&O's Detail    Daily Height in cm: 165.1 (05 Jun 2021 19:46)    Daily     RADIOLOGY & ADDITIONAL STUDIES: Patient is a 60y old  Female who presents with a chief complaint of     HPI:  Pt is a 61 y/o female with hx of HTN, bradycardia, presents to ED for syncope that occurred PTA. Pt states after dinner and a couple of sangria drinks, was stepping out of car and had syncopal episode. No chest pain or headache PTA. Pt fall to ground, + head trauma. No seizure activity. Now only c/o mild abrasion to lower lip and left knee pain mild.    EP consulted for syncope.     REVIEW OF SYSTEMS    [x] A ten-point review of systems was otherwise negative except as noted.  [ ] Due to altered mental status/intubation, subjective information were not able to be obtained from the patient. History was obtained, to the extent possible, from review of the chart and collateral sources of information.      PAST MEDICAL & SURGICAL HISTORY:  History of thyroid cancer  s/p resection 2 years ago    Anxiety    Syncope, unspecified syncope type    H/O thyroidectomy        Home Medications:  flecainide: 25 milligram(s) orally once a day (06 Jun 2021 02:00)  losartan 25 mg oral tablet: 1 tab(s) orally once a day (06 Jun 2021 02:00)  Synthroid: 120 milligram(s) orally once a day (06 Jun 2021 02:00)  Synthroid:  (06 Jun 2021 02:00)  Valium:  (06 Jun 2021 02:00)      Allergies:  No Known Allergies    FAMILY HISTORY:  No pertinent family history in first degree relatives    MEDICATIONS  (STANDING):    MEDICATIONS  (PRN):      Vital Signs Last 24 Hrs  T(C): 36.2 (06 Jun 2021 07:30), Max: 36.6 (05 Jun 2021 19:46)  T(F): 97.2 (06 Jun 2021 07:30), Max: 97.8 (05 Jun 2021 19:46)  HR: 59 (06 Jun 2021 07:30) (56 - 73)  BP: 148/85 (06 Jun 2021 07:30) (115/67 - 165/80)  BP(mean): --  RR: 18 (06 Jun 2021 07:30) (18 - 20)  SpO2: 97% (06 Jun 2021 07:30) (96% - 99%)    PHYSICAL EXAM:    GENERAL: In no apparent distress, well nourished, and hydrated.  HEART: Regular rate and rhythm; No murmurs, rubs, or gallops.  PULMONARY: Clear to auscultation and perfusion.  No rales, wheezing, or rhonchi bilaterally.  ABDOMEN: Soft, Nontender, Nondistended; Bowel sounds present  EXTREMITIES:  2+ Peripheral Pulses, No clubbing, cyanosis, or edema  NEUROLOGICAL: Grossly nonfocal      INTERPRETATION OF TELEMETRY:     ECG: < from: 12 Lead ECG (06.06.21 @ 00:10) >  Ventricular Rate 56 BPM    Atrial Rate 56 BPM    P-R Interval 180 ms    QRS Duration 82 ms    Q-T Interval 510 ms    QTC Calculation(Bazett) 492 ms    P Axis 59 degrees    R Axis 6 degrees    T Axis 39 degrees    Diagnosis Line Sinus bradycardia    < end of copied text >      I&O's Detail      LABS:                        14.2   9.73  )-----------( 332      ( 05 Jun 2021 20:22 )             44.2     06-05    141  |  103  |  15  ----------------------------<  107<H>  4.0   |  26  |  0.8    Ca    9.8      05 Jun 2021 20:22  Mg     2.2     06-05    TPro  6.9  /  Alb  4.4  /  TBili  0.4  /  DBili  x   /  AST  11  /  ALT  14  /  AlkPhos  78  06-05    CARDIAC MARKERS ( 06 Jun 2021 00:30 )  x     / <0.01 ng/mL / x     / x     / x      CARDIAC MARKERS ( 05 Jun 2021 20:22 )  x     / <0.01 ng/mL / x     / x     / x        I&O's Detail    Daily Height in cm: 165.1 (05 Jun 2021 19:46)    Daily     RADIOLOGY & ADDITIONAL STUDIES:  < from: TTE Echo Complete w/o Contrast w/ Doppler (06.06.21 @ 09:36) >  Summary:   1. Normal global left ventricular systolic function.   2. LV Ejection Fraction by Corley's Method with a biplane EF of 61 %.   3. Normal left ventricular internal cavity size.   4. Spectral Doppler shows impaired relaxation pattern of left ventricular myocardial filling (Grade I diastolic dysfunction).   5. Normal left atrial size.   6. Normal right atrial size.   7. Trivial pericardial effusion.   8. Mild thickening of the anterior and posterior mitral valve leaflets.   9. No evidence of mitral valve regurgitation.  10. Mild tricuspid regurgitation.    PHYSICIAN INTERPRETATION:  Left Ventricle: The left ventricular internal cavity size is normal. Left ventricular wall thickness is normal. Global LV systolic function was normal. Spectral Doppler shows impaired relaxation pattern of left ventricular myocardial filling (Grade I diastolic dysfunction).  Right Ventricle: The right ventricular size is normal. RV systolic function is normal.  Left Atrium: Normal left atrial size.  Right Atrium: Normal right atrial size.  Pericardium: Trivial pericardial effusion is present.  Mitral Valve: Mild thickening of the anterior and posterior mitral valve leaflets. No evidence of mitral valve regurgitation is seen.  Tricuspid Valve: The tricuspid valve is normal in structure. Mild tricuspid regurgitation is visualized.  Aortic Valve: The aortic valve is trileaflet. No evidence of aortic stenosis. Peak transaortic gradient equals 6.5 mmHg, mean transaortic gradient equals 3.6 mmHg, the calculated aortic valve area equals 2.08 cm² by the continuity equation. No evidence of aortic valve regurgitation is seen.  Pulmonic Valve: The pulmonic valve is thickened with good excursion. Trace pulmonic valve regurgitation.  Venous: The inferior vena cava is normal.    < end of copied text >

## 2021-06-06 NOTE — ED ADULT NURSE REASSESSMENT NOTE - NS ED NURSE REASSESS COMMENT FT1
Pt. assessed. Pt. alert and responsive to tactile and verbal stimuli, oriented to person time place and situation. Pt. rested well throughout shift in no apparent distress. Pt. complains of headache at this time. GEOFFREY Moon ([id number]) made aware. Medications given as per order. Will reassess at later time. Cardiac and vital sign monitoring ongoing. Safety maintained. Will continue to monitor.

## 2021-06-06 NOTE — ED ADULT NURSE REASSESSMENT NOTE - NS ED NURSE REASSESS COMMENT FT1
Pt reassessed A/O times 4 Vs see flow sheet orthostatic BP order is done ED attending made aware ,pt report filing slight better ambulate steady did eat 75% of lunch , assistance provide safety precaution on progress on going nursing observation . Pt reassessed A/O times 4 Vs see flow sheet orthostatic BP order is done ED attending made aware ,pt report filing slight better ambulate steady did eat 75% of lunch , assistance provide safety precaution on progress on going nursing observation .Pt is seen evaluate by Ed attending clear to go home ready to be D/C with family members ambulate steady .

## 2021-06-06 NOTE — ED CDU PROVIDER INITIAL DAY NOTE - PMH
Anxiety    History of thyroid cancer  s/p resection 2 years ago  Syncope, unspecified syncope type

## 2021-06-06 NOTE — ED CDU PROVIDER INITIAL DAY NOTE - PROGRESS NOTE DETAILS
FF: pt reports she is unsure of the exact amount of losartan and flecainide she takes her  sent her a picture of the bottles but the image cuts off the mg. rather not take it until she knows the exact mg Pt stable denies any complaints. EP consulted, will see pt. Pending echo Spoke to daughter. findings discussed. Prolonged QTc. Echo discussed. Spoke to Sandra simon EP. Will be seeing pt soon with attending.

## 2021-06-06 NOTE — ED CDU PROVIDER INITIAL DAY NOTE - NS ED ROS FT
CONST: No fever, chills or bodyaches  EYES: No pain, redness, drainage or visual changes.  ENT: No ear pain or discharge, nasal discharge or congestion. No sore throat  CARD: No chest pain, palpitations. (+) syncope  RESP: No SOB, cough, hemoptysis. No hx of asthma or COPD  GI: No abdominal pain, N/V/D  : No urinary symptoms  MS: No joint pain, back pain or extremity pain/injury  SKIN: No rashes  NEURO: No headache, dizziness, paresthesias or LOC

## 2021-06-06 NOTE — CONSULT NOTE ADULT - ATTENDING COMMENTS
## Syncope  ## ? Atrial Arrhyhtmia    - Syncope likely vagal in etiology- similar to prior episodes 5-7 years ago  - Orthostatic vitals  - Encourage PO intake  - Discussed option of long-term monitoring with ILR/MCOT. At this moment, they are not interested. They will discuss as an OP  - Continue with flecainide and metoprolol  - No further work-up inpatient  - Can DC from EP standpoint of view  - OP f-up

## 2021-06-06 NOTE — ED CDU PROVIDER DISPOSITION NOTE - PATIENT PORTAL LINK FT
You can access the FollowMyHealth Patient Portal offered by [facility] by registering at the following website: [email or web address]. By joining [facility]’s FollowMyHealth portal, you will also be able to view your health information using other applications (apps) compatible with our system.

## 2021-06-06 NOTE — ED CDU PROVIDER INITIAL DAY NOTE - PHYSICAL EXAMINATION
Physical Exam    Vital Signs: I have reviewed the initial vital signs.  Constitutional: well-nourished, appears stated age, no acute distress  Eyes: Conjunctiva pink, Sclera clear, PERRLA, EOMI  Cardiovascular: S1 and S2, regular rate, regular rhythm, well-perfused extremities, radial pulses equal and 2+ b/l.   Respiratory: unlabored respiratory effort, clear to auscultation bilaterally no wheezing, rales and rhonchi. pt is speaking full sentences. no accessory muscle use.   Gastrointestinal: soft, non-tender, nondistended abdomen, no pulsatile mass, normal bowl sounds, no rebound, no guarding, no organomegaly.   Musculoskeletal: supple neck, no lower extremity edema, no calf tenderness, no midline tenderness, no palpable spinal step offs  Integumentary: warm, dry, no rash  Neurologic: awake, alert, cranial nerves II-XII grossly intact, extremities’ motor and sensory functions grossly intact. finger to nose intact. negative pronator drift. negative romberg. steady gait.   Psychiatric: appropriate mood, appropriate affect

## 2021-06-06 NOTE — ED CDU PROVIDER DISPOSITION NOTE - CARE PROVIDER_API CALL
Des Spann (MD)  Cardiac Electrophysiology; Cardiology; Internal Medicine  [address] [place] [address]  Phone: [phone]  Fax: [phone]  Follow Up Time:

## 2021-06-17 PROBLEM — Z00.00 ENCOUNTER FOR PREVENTIVE HEALTH EXAMINATION: Status: ACTIVE | Noted: 2021-06-17

## 2021-06-17 PROBLEM — R55 SYNCOPE AND COLLAPSE: Chronic | Status: ACTIVE | Noted: 2021-06-06

## 2021-06-23 ENCOUNTER — APPOINTMENT (OUTPATIENT)
Dept: CARDIOLOGY | Facility: CLINIC | Age: 61
End: 2021-06-23
Payer: MEDICAID

## 2021-06-23 VITALS
SYSTOLIC BLOOD PRESSURE: 120 MMHG | DIASTOLIC BLOOD PRESSURE: 80 MMHG | TEMPERATURE: 97.7 F | HEIGHT: 65 IN | WEIGHT: 196 LBS | HEART RATE: 59 BPM | OXYGEN SATURATION: 97 % | BODY MASS INDEX: 32.65 KG/M2

## 2021-06-23 PROCEDURE — 99213 OFFICE O/P EST LOW 20 MIN: CPT

## 2021-06-23 PROCEDURE — 93000 ELECTROCARDIOGRAM COMPLETE: CPT

## 2021-06-27 RX ORDER — PREDNISONE 20 MG/1
20 TABLET ORAL
Qty: 18 | Refills: 0 | Status: ACTIVE | COMMUNITY
Start: 2021-05-28

## 2021-06-27 RX ORDER — METOPROLOL SUCCINATE 25 MG/1
25 TABLET, EXTENDED RELEASE ORAL
Qty: 30 | Refills: 0 | Status: ACTIVE | COMMUNITY
Start: 2021-06-09

## 2021-06-27 RX ORDER — OMEPRAZOLE 20 MG/1
20 CAPSULE, DELAYED RELEASE ORAL
Qty: 30 | Refills: 0 | Status: ACTIVE | COMMUNITY
Start: 2021-03-10

## 2021-06-27 RX ORDER — VALSARTAN 80 MG/1
80 TABLET, COATED ORAL
Qty: 30 | Refills: 0 | Status: ACTIVE | COMMUNITY
Start: 2021-02-07

## 2021-06-27 RX ORDER — DICLOFENAC SODIUM 1% 10 MG/G
1 GEL TOPICAL
Qty: 100 | Refills: 0 | Status: ACTIVE | COMMUNITY
Start: 2021-05-07

## 2021-06-27 RX ORDER — ATORVASTATIN CALCIUM 20 MG/1
20 TABLET, FILM COATED ORAL
Qty: 30 | Refills: 0 | Status: ACTIVE | COMMUNITY
Start: 2021-01-06

## 2021-06-27 RX ORDER — SUMATRIPTAN 100 MG/1
100 TABLET, FILM COATED ORAL
Qty: 9 | Refills: 0 | Status: ACTIVE | COMMUNITY
Start: 2021-06-03

## 2021-06-27 RX ORDER — LEVOTHYROXINE SODIUM 0.11 MG/1
112 TABLET ORAL
Qty: 30 | Refills: 0 | Status: ACTIVE | COMMUNITY
Start: 2021-06-14

## 2021-06-27 RX ORDER — LEVOTHYROXINE SODIUM 0.09 MG/1
88 TABLET ORAL
Qty: 30 | Refills: 0 | Status: ACTIVE | COMMUNITY
Start: 2021-01-07

## 2021-06-27 RX ORDER — ALBUTEROL SULFATE 90 UG/1
108 (90 BASE) INHALANT RESPIRATORY (INHALATION)
Qty: 18 | Refills: 0 | Status: ACTIVE | COMMUNITY
Start: 2021-01-18

## 2021-06-27 RX ORDER — IBUPROFEN 600 MG/1
600 TABLET, FILM COATED ORAL
Qty: 21 | Refills: 0 | Status: ACTIVE | COMMUNITY
Start: 2021-01-13

## 2021-06-27 NOTE — HISTORY OF PRESENT ILLNESS
[FreeTextEntry1] : I had a pleasure of seeing Ms. AVENDAÑO for follow-up consultation for palpitation and LOC. She is accompanied by her daughter. \par \par Ms. AVENDAÑO is a 60 year-year old female with history of multinodular goiter, PVC, HTN is here for f-up.\par Recently, had a LOC episodes- was in ER at [facility]. Thought to be vagal or orthostatic. Similar episodes ~ 5-7 years.\par Was treated by Dr. Vang for palpitation (which turned out to be PVC as per the notes).\par Denies any more palpitations.\par \par Denies chest pain, shortness of breath, palpitation, dizziness or LOC except noted above.\par \par EKG: SR@59/min,  ms, QRSd 90 ms, QTc 378 ms\par TTE (06/21): EF 61%, nl LA\par MPI (10/20, [facility]): Nl EF, no reversible defects

## 2021-06-27 NOTE — PHYSICAL EXAM
[Well Developed] : well developed [Well Nourished] : well nourished [No Acute Distress] : no acute distress [Normal Venous Pressure] : normal venous pressure [Normal Conjunctiva] : normal conjunctiva [No Carotid Bruit] : no carotid bruit [Normal S1, S2] : normal S1, S2 [No Murmur] : no murmur [No Rub] : no rub [No Gallop] : no gallop [Clear Lung Fields] : clear lung fields [Good Air Entry] : good air entry [No Respiratory Distress] : no respiratory distress  [Soft] : abdomen soft [Non Tender] : non-tender [No Masses/organomegaly] : no masses/organomegaly [Normal Bowel Sounds] : normal bowel sounds [Normal Gait] : normal gait [No Edema] : no edema [No Cyanosis] : no cyanosis [No Clubbing] : no clubbing [No Varicosities] : no varicosities [No Rash] : no rash [No Skin Lesions] : no skin lesions [Moves all extremities] : moves all extremities [No Focal Deficits] : no focal deficits [Normal Speech] : normal speech [Alert and Oriented] : alert and oriented [Normal memory] : normal memory

## 2021-06-27 NOTE — ASSESSMENT
[FreeTextEntry1] : ## Palpitations\par ## Syncope- likely vagal/orthostatics\par \par - Continue flecainide and metoprolol\par - Will obtain records from Dr. Vang and Dr. Vidales.\par - Encourage PO fluid intake\par - As she doesn't have palpitations, we discussed possibility of discontinuation of flecainide. However, we will wait for records from Dr. Vang\par - Return in 2 months

## 2021-07-02 ENCOUNTER — APPOINTMENT (OUTPATIENT)
Dept: CARDIOLOGY | Facility: CLINIC | Age: 61
End: 2021-07-02
Payer: MEDICAID

## 2021-07-02 PROCEDURE — ZZZZZ: CPT

## 2021-08-18 ENCOUNTER — APPOINTMENT (OUTPATIENT)
Dept: CARDIOLOGY | Facility: CLINIC | Age: 61
End: 2021-08-18

## 2021-08-18 ENCOUNTER — APPOINTMENT (OUTPATIENT)
Dept: CARDIOLOGY | Facility: CLINIC | Age: 61
End: 2021-08-18
Payer: MEDICAID

## 2021-08-18 VITALS
HEART RATE: 60 BPM | BODY MASS INDEX: 32.65 KG/M2 | WEIGHT: 196 LBS | SYSTOLIC BLOOD PRESSURE: 132 MMHG | HEIGHT: 65 IN | DIASTOLIC BLOOD PRESSURE: 90 MMHG | TEMPERATURE: 98.2 F

## 2021-08-18 DIAGNOSIS — I49.3 VENTRICULAR PREMATURE DEPOLARIZATION: ICD-10-CM

## 2021-08-18 PROCEDURE — 93000 ELECTROCARDIOGRAM COMPLETE: CPT | Mod: 59

## 2021-08-18 PROCEDURE — 93228 REMOTE 30 DAY ECG REV/REPORT: CPT

## 2021-08-18 PROCEDURE — 99214 OFFICE O/P EST MOD 30 MIN: CPT

## 2021-08-18 NOTE — HISTORY OF PRESENT ILLNESS
[FreeTextEntry1] : I had a pleasure of seeing Ms. AVENDAÑO for follow-up consultation for palpitation and LOC. She is accompanied by her daughter. \par \par Ms. AVENDAÑO is a 60 year-year old female with history of multinodular goiter, PVC, HTN is here for f-up.\par Recently, had a LOC episodes- was in ER at [facility]. Thought to be vagal or orthostatic. Similar episodes ~ 5-7 years.\par Was treated by Dr. Vang for palpitation (which turned out to be PVC as per the notes).\par Denies any more palpitations.\par \par 8/18: Feels better. Occasional palpitations at night time. No more episodes of dizziness or LOC.\par \par Denies chest pain, shortness of breath, palpitation, dizziness or LOC except noted above.\par \par EKG (08/18): SR@60\par EKG: SR@59/min,  ms, QRSd 90 ms, QTc 378 ms\par TTE (06/21): EF 61%, nl LA\par MPI (10/20, [facility]): Nl EF, no reversible defects

## 2021-08-18 NOTE — ASSESSMENT
[FreeTextEntry1] : ## Palpitations\par ## PVCs\par ## Syncope- likely vagal/orthostatics\par \par - Reviewed records from Julee Vang and [facility]\par - Event monitor reviewed 7% PVC burden. Discussed management options. Due to minimal symptoms, she wants to discontinue flecainide. We will continue with metoprolol. She can take extra 25 mg at night if symptoms are recurrent. To contact us if consistent symptoms.\par - Return in 6 months

## 2022-02-16 ENCOUNTER — APPOINTMENT (OUTPATIENT)
Dept: CARDIOLOGY | Facility: CLINIC | Age: 62
End: 2022-02-16
Payer: MEDICAID

## 2022-02-16 VITALS
SYSTOLIC BLOOD PRESSURE: 130 MMHG | OXYGEN SATURATION: 95 % | WEIGHT: 193 LBS | TEMPERATURE: 98.4 F | HEART RATE: 77 BPM | BODY MASS INDEX: 32.15 KG/M2 | DIASTOLIC BLOOD PRESSURE: 88 MMHG | HEIGHT: 65 IN

## 2022-02-16 PROCEDURE — 93000 ELECTROCARDIOGRAM COMPLETE: CPT

## 2022-02-16 PROCEDURE — 99214 OFFICE O/P EST MOD 30 MIN: CPT

## 2022-04-09 RX ORDER — METOPROLOL SUCCINATE 50 MG/1
50 TABLET, EXTENDED RELEASE ORAL DAILY
Qty: 30 | Refills: 11 | Status: DISCONTINUED | COMMUNITY
Start: 1900-01-01 | End: 2022-04-09

## 2022-04-09 RX ORDER — FLECAINIDE ACETATE 50 MG/1
50 TABLET ORAL DAILY
Refills: 0 | Status: DISCONTINUED | COMMUNITY
End: 2022-04-09

## 2022-04-09 NOTE — ASSESSMENT
[FreeTextEntry1] : ## Palpitations- likely due to PVC\par ## Syncope- likely vagal/orthostatics\par \par - Off flecainide.\par - No further symptoms. Will DC metoprolol\par - Reviewed records from Dr. Vang\par - Encourage PO fluid intake\par - Return as needed

## 2022-04-09 NOTE — HISTORY OF PRESENT ILLNESS
[FreeTextEntry1] : I had a pleasure of seeing Ms. AVENDAÑO for follow-up consultation for palpitation and LOC. She is accompanied by her daughter. \par \par Ms. AVENDAÑO is a 60 year-year old female with history of multinodular goiter, PVC, HTN is here for f-up.\par Recently, had a LOC episodes- was in ER at [facility]. Thought to be vagal or orthostatic. Similar episodes ~ 5-7 years.\par Was treated by Dr. aVng for palpitation (which turned out to be PVC as per the notes).\par Denies any more palpitations.\par \par 8/18: Feels better. Occasional palpitations at night time. No more episodes of dizziness or LOC.\par \par 2/16: Feels better.\par \par Denies chest pain, shortness of breath, palpitation, dizziness or LOC except noted above.\par \par EKG (2/16): SR\par EKG (08/18): SR@60\par EKG: SR@59/min,  ms, QRSd 90 ms, QTc 378 ms\par TTE (06/21): EF 61%, nl LA\par MPI (10/20, [facility]): Nl EF, no reversible defects

## 2022-05-26 ENCOUNTER — APPOINTMENT (OUTPATIENT)
Dept: CARDIOLOGY | Facility: CLINIC | Age: 62
End: 2022-05-26

## 2022-08-10 ENCOUNTER — APPOINTMENT (OUTPATIENT)
Dept: PAIN MANAGEMENT | Facility: CLINIC | Age: 62
End: 2022-08-10

## 2022-08-28 NOTE — ED ADULT NURSE NOTE - EXTENSIONS OF SELF_ADULT
Mercedes Linn is here for Follow up evaluation of potential COVID-19 exposure.      Quarantine completed.   no longer actively monitoring TM.                   You must wear a procedure mask at all times while in the workplace.    Off work end date: 08/28/22            
None

## 2022-08-31 ENCOUNTER — APPOINTMENT (OUTPATIENT)
Dept: CARDIOLOGY | Facility: CLINIC | Age: 62
End: 2022-08-31

## 2022-09-02 ENCOUNTER — APPOINTMENT (OUTPATIENT)
Dept: PAIN MANAGEMENT | Facility: CLINIC | Age: 62
End: 2022-09-02

## 2022-09-02 PROCEDURE — 64615 CHEMODENERV MUSC MIGRAINE: CPT

## 2022-09-02 NOTE — PROCEDURE
[FreeTextEntry3] : Procedure: A chemo denervation for chronic migraine headaches\par \par Physician: Opal Jones MD\par \par Medical Necessity: Failure of conservative management with more than 15 migraine headaches a month, with each episode lasting more than 4 hours and for maintenance therapy.\par \par Summary: Patient presents today for Botox treatment. Her last treatment was on 10/15/2021. She states that her treatment has been delayed because of an illness in her family.\par \par Last Botox: 10/15/2021\par Side effects: None\par Current Meds: Nurtec ODT\par \par Exam: Patient is alert and oriented x3. Speech is fluid. No facial asymmetry or weakness. EOMI. normal strength in the upper and lower extremities. Sensation intact to light touch. Gait is normal.\par \par The patient's diagnosis and treatment plan was discussed in detail. Potential side effects of Botox injections for migraine were discussed with the patient in detail. Possible side effects of Botox injection include neck pain, headache, eyelid ptosis, migraine, muscular weakness, musculoskeletal stiffness, injection site pain, bronchitis, musculoskeletal pain, myalgia, facial paresis, hypertension, and muscle spasm. The patient verbalized understanding and informed consent was obtained.\par \par Procedure: The patient was first placed in a supine position. The area of the forehead and between the eyes were prepped with alcohol. Botox 200 units were reconstituted with 4 mL of preservative-free 0.9% saline solution. A 30-gauge half inch needle was used. The following muscles and unit dosages were used:  10 units divided in two sites; procerus 5 units in one site;\par frontalis 20 units divided in four sites. Then patient turns head to either side and temporalis 20 units divided in four sites were given on each side. Next patient was transferred onto a chair and occipitalis 30 units divided in six sites were injected; cervical paraspinals 20 units divided in four sites; and trapezius 30 units divided in six sites for a total 155 units; 45 units of Botox was wasted.\par \par Complications: None.\par Disposition: I examined the patient. There are no new physical findings since the original presentation. The patient was discharged home.\par \par \par Comments: The patient will follow up for medication management in a few weeks.\par \par This document was electronically signed by: Opal Jones MD

## 2022-10-18 ENCOUNTER — APPOINTMENT (OUTPATIENT)
Dept: NEUROLOGY | Facility: CLINIC | Age: 62
End: 2022-10-18

## 2022-11-01 DIAGNOSIS — M54.41 LUMBAGO WITH SCIATICA, RIGHT SIDE: ICD-10-CM

## 2022-11-01 DIAGNOSIS — M54.42 LUMBAGO WITH SCIATICA, RIGHT SIDE: ICD-10-CM

## 2022-11-01 DIAGNOSIS — G89.29 LUMBAGO WITH SCIATICA, RIGHT SIDE: ICD-10-CM

## 2022-11-15 ENCOUNTER — APPOINTMENT (OUTPATIENT)
Dept: NEUROLOGY | Facility: CLINIC | Age: 62
End: 2022-11-15

## 2022-11-20 ENCOUNTER — FORM ENCOUNTER (OUTPATIENT)
Age: 62
End: 2022-11-20

## 2022-11-28 ENCOUNTER — FORM ENCOUNTER (OUTPATIENT)
Age: 62
End: 2022-11-28

## 2022-12-21 ENCOUNTER — APPOINTMENT (OUTPATIENT)
Dept: PAIN MANAGEMENT | Facility: CLINIC | Age: 62
End: 2022-12-21

## 2022-12-21 PROCEDURE — 64615 CHEMODENERV MUSC MIGRAINE: CPT

## 2022-12-21 NOTE — PROCEDURE
[FreeTextEntry3] : Procedure: A chemo denervation for chronic migraine headaches\par \par Physician: Opal Jones MD\par \par Medical Necessity: Failure of conservative management with more than 15 migraine headaches a month, with each episode lasting more than 4 hours and for maintenance therapy.\par \par Summary: Patient presents today for Botox treatment. Her last treatment was on 9/02/2022. She states that she has been noticing that the effectiveness of the Botox has been waning.\par \par Last Botox: 9/02/2022\par Side effects: None\par Current Meds: Nurtec ODT\par \par Exam: Patient is alert and oriented x3. Speech is fluid. No facial asymmetry or weakness. EOMI. normal strength in the upper and lower extremities. Sensation intact to light touch. Gait is normal.\par \par The patient's diagnosis and treatment plan was discussed in detail. Potential side effects of Botox injections for migraine were discussed with the patient in detail. Possible side effects of Botox injection include neck pain, headache, eyelid ptosis, migraine, muscular weakness, musculoskeletal stiffness, injection site pain, bronchitis, musculoskeletal pain, myalgia, facial paresis, hypertension, and muscle spasm. The patient verbalized understanding and informed consent was obtained.\par \par Procedure: The patient was first placed in a supine position. The area of the forehead and between the eyes were prepped with alcohol. Botox 200 units were reconstituted with 4 mL of preservative-free 0.9% saline solution. A 30-gauge half inch needle was used. The following muscles and unit dosages were used:  10 units divided in two sites; procerus 5 units in one site;\par frontalis 20 units divided in four sites. Then patient turns head to either side and temporalis 20 units divided in four sites were given on each side. Next patient was transferred onto a chair and occipitalis 30 units divided in six sites were injected; cervical paraspinals 20 units divided in four sites; and trapezius 30 units divided in six sites for a total 155 units; 45 units of Botox was wasted.\par \par Complications: None.\par Disposition: I examined the patient. There are no new physical findings since the original presentation. The patient was discharged home.\par \par \par Comments: The patient will follow up for medication management in a few weeks.\par \par This document was electronically signed by: Opal Jones MD

## 2022-12-22 ENCOUNTER — APPOINTMENT (OUTPATIENT)
Dept: NEUROLOGY | Facility: CLINIC | Age: 62
End: 2022-12-22

## 2022-12-27 ENCOUNTER — APPOINTMENT (OUTPATIENT)
Dept: NEUROLOGY | Facility: CLINIC | Age: 62
End: 2022-12-27

## 2022-12-27 VITALS
BODY MASS INDEX: 32.15 KG/M2 | HEART RATE: 99 BPM | SYSTOLIC BLOOD PRESSURE: 169 MMHG | DIASTOLIC BLOOD PRESSURE: 79 MMHG | HEIGHT: 65 IN | WEIGHT: 193 LBS

## 2022-12-27 DIAGNOSIS — M54.2 CERVICALGIA: ICD-10-CM

## 2022-12-27 PROCEDURE — 99214 OFFICE O/P EST MOD 30 MIN: CPT

## 2022-12-27 NOTE — REVIEW OF SYSTEMS
[Migraine Headache] : migraine headaches [Tension Headache] : tension-type headaches [Joint Pain] : joint pain [Negative] : Musculoskeletal

## 2023-01-13 ENCOUNTER — APPOINTMENT (OUTPATIENT)
Dept: MRI IMAGING | Facility: CLINIC | Age: 63
End: 2023-01-13

## 2023-01-19 NOTE — HISTORY OF PRESENT ILLNESS
[FreeTextEntry1] : TODAY:  I had the pleasure of seeing Ms. Pinon today in follow up.  Her previous history and physical findings have been reviewed.\par \par She is under our care for chronic migraines and cervical pain which she is receiving continung active treatment for.  She is s/p Botox treatment for her migraines which she states has not provided her with any improvement of her headaches.  They continue to occur daily and are so severe at times.  She states they occur 6 days a week and she has failed trials of so many preventive and abortive medications including topiramate, inderal, gabapentin, magnesium. maxalt, relpax, and imitrex.  She states the headaches are a sharp, throbbing pain associated with nausea.  She does feel some of the headaches are due to her neck pain as she feels it radiate up and into the back of her head.  We will evaluate her further today as she rates the pain an 8-9/10.

## 2023-01-19 NOTE — PHYSICAL EXAM
[General Appearance - Alert] : alert [General Appearance - In No Acute Distress] : in no acute distress [General Appearance - Well Nourished] : well nourished [General Appearance - Well Developed] : well developed [General Appearance - Well-Appearing] : healthy appearing [Oriented To Time, Place, And Person] : oriented to person, place, and time [Affect] : the affect was normal [Mood] : the mood was normal [Memory Recent] : recent memory was not impaired [Memory Remote] : remote memory was not impaired [Person] : oriented to person [Place] : oriented to place [Time] : oriented to time [Short Term Intact] : short term memory intact [Remote Intact] : remote memory intact [Registration Intact] : recent registration memory intact [Cranial Nerves Optic (II)] : visual acuity intact bilaterally,  visual fields full to confrontation, pupils equal round and reactive to light [Cranial Nerves Oculomotor (III)] : extraocular motion intact [Cranial Nerves Facial (VII)] : face symmetrical [Cranial Nerves Accessory (XI - Cranial And Spinal)] : head turning and shoulder shrug symmetric [Motor Tone] : muscle tone was normal in all four extremities [Motor Strength] : muscle strength was normal in all four extremities [Involuntary Movements] : no involuntary movements were seen [FreeTextEntry5] : + tenderness to palpation cervical spine, decrease ROM due to stiffness in all planes

## 2023-01-19 NOTE — ASSESSMENT
[FreeTextEntry1] : 62 year old female with chronic migraines and neck pain.  We will order an MRI of the cervical spine as well as MRI brain for further evaluation.  She will trial chiropractic treatments which were helpful in the past and we will trial Emgality 120mg/ml inject once a month for migraine prevention holding off on botox for now.  For abortive means we will have her trial Nurtec 75 mg prn and f/u in 6-8 weeks for re evaluation.  She is aware if there are any issues she can contact the office.\par \par I personally reviewed with the PA, this patient's history and physical exam findings, as documented above. I have discussed the relevant areas of concern, having direct implications to the presenting problems and illnesses, and I have personally examined all pertinent and positive and negative findings, which impact on the prior neurological treatment. \par \par \par Sandra Flores, MS, PA-C\par Jian Jones DO\par

## 2023-02-03 ENCOUNTER — APPOINTMENT (OUTPATIENT)
Dept: MRI IMAGING | Facility: CLINIC | Age: 63
End: 2023-02-03

## 2023-02-17 ENCOUNTER — NON-APPOINTMENT (OUTPATIENT)
Age: 63
End: 2023-02-17

## 2023-02-20 ENCOUNTER — APPOINTMENT (OUTPATIENT)
Dept: MRI IMAGING | Facility: CLINIC | Age: 63
End: 2023-02-20

## 2023-02-23 ENCOUNTER — APPOINTMENT (OUTPATIENT)
Dept: NEUROLOGY | Facility: CLINIC | Age: 63
End: 2023-02-23

## 2023-02-27 ENCOUNTER — RX RENEWAL (OUTPATIENT)
Age: 63
End: 2023-02-27

## 2023-03-09 ENCOUNTER — RX RENEWAL (OUTPATIENT)
Age: 63
End: 2023-03-09

## 2023-03-23 ENCOUNTER — RX RENEWAL (OUTPATIENT)
Age: 63
End: 2023-03-23

## 2023-04-03 ENCOUNTER — APPOINTMENT (OUTPATIENT)
Dept: MRI IMAGING | Facility: CLINIC | Age: 63
End: 2023-04-03
Payer: MEDICAID

## 2023-04-03 PROCEDURE — 70551 MRI BRAIN STEM W/O DYE: CPT

## 2023-04-12 ENCOUNTER — FORM ENCOUNTER (OUTPATIENT)
Age: 63
End: 2023-04-12

## 2023-04-19 ENCOUNTER — APPOINTMENT (OUTPATIENT)
Dept: PAIN MANAGEMENT | Facility: CLINIC | Age: 63
End: 2023-04-19
Payer: MEDICAID

## 2023-04-19 PROCEDURE — 64615 CHEMODENERV MUSC MIGRAINE: CPT

## 2023-04-19 NOTE — PROCEDURE
[FreeTextEntry3] : Procedure: A chemo denervation for chronic migraine headaches\par \par Physician: Opal Jones MD\par \par Medical Necessity: Failure of conservative management with more than 15 migraine headaches a month, with each episode lasting more than 4 hours and for maintenance therapy.\par \par Summary: Patient presents today for Botox treatment. Her last treatment was on 12/21/2022. She states that she has been noticing that the effectiveness of the Botox has been waning.\par \par Last Botox: 12/21/2022\par Side effects: None\par Current Meds: Nurtec ODT\par \par Exam: Patient is alert and oriented x3. Speech is fluid. No facial asymmetry or weakness. EOMI. normal strength in the upper and lower extremities. Sensation intact to light touch. Gait is normal.\par \par The patient's diagnosis and treatment plan was discussed in detail. Potential side effects of Botox injections for migraine were discussed with the patient in detail. Possible side effects of Botox injection include neck pain, headache, eyelid ptosis, migraine, muscular weakness, musculoskeletal stiffness, injection site pain, bronchitis, musculoskeletal pain, myalgia, facial paresis, hypertension, and muscle spasm. The patient verbalized understanding and informed consent was obtained.\par \par Procedure: The patient was first placed in a supine position. The area of the forehead and between the eyes were prepped with alcohol. Botox 200 units were reconstituted with 4 mL of preservative-free 0.9% saline solution. A 30-gauge half inch needle was used. The following muscles and unit dosages were used:  10 units divided in two sites; procerus 5 units in one site;\par frontalis 20 units divided in four sites. Then patient turns head to either side and temporalis 20 units divided in four sites were given on each side. Next patient was transferred onto a chair and occipitalis 30 units divided in six sites were injected; cervical paraspinals 20 units divided in four sites; and trapezius 30 units divided in six sites for a total 155 units; 45 units of Botox was wasted.\par \par Complications: None.\par Disposition: I examined the patient. There are no new physical findings since the original presentation. The patient was discharged home.\par \par \par Comments: The patient will follow up for medication management in a few weeks.\par \par This document was electronically signed by: Opal Jones MD
[FreeTextEntry3] : Procedure: A chemo denervation for chronic migraine headaches\par \par Physician: Opal Jones MD\par \par Medical Necessity: Failure of conservative management with more than 15 migraine headaches a month, with each episode lasting more than 4 hours and for maintenance therapy.\par \par Summary: Patient presents today for Botox treatment. Her last treatment was on 12/21/2022. She states that she has been noticing that the effectiveness of the Botox has been waning.\par \par Last Botox: 12/21/2022\par Side effects: None\par Current Meds: Nurtec ODT\par \par Exam: Patient is alert and oriented x3. Speech is fluid. No facial asymmetry or weakness. EOMI. normal strength in the upper and lower extremities. Sensation intact to light touch. Gait is normal.\par \par The patient's diagnosis and treatment plan was discussed in detail. Potential side effects of Botox injections for migraine were discussed with the patient in detail. Possible side effects of Botox injection include neck pain, headache, eyelid ptosis, migraine, muscular weakness, musculoskeletal stiffness, injection site pain, bronchitis, musculoskeletal pain, myalgia, facial paresis, hypertension, and muscle spasm. The patient verbalized understanding and informed consent was obtained.\par \par Procedure: The patient was first placed in a supine position. The area of the forehead and between the eyes were prepped with alcohol. Botox 200 units were reconstituted with 4 mL of preservative-free 0.9% saline solution. A 30-gauge half inch needle was used. The following muscles and unit dosages were used:  10 units divided in two sites; procerus 5 units in one site;\par frontalis 20 units divided in four sites. Then patient turns head to either side and temporalis 20 units divided in four sites were given on each side. Next patient was transferred onto a chair and occipitalis 30 units divided in six sites were injected; cervical paraspinals 20 units divided in four sites; and trapezius 30 units divided in six sites for a total 155 units; 45 units of Botox was wasted.\par \par Complications: None.\par Disposition: I examined the patient. There are no new physical findings since the original presentation. The patient was discharged home.\par \par \par Comments: The patient will follow up for medication management in a few weeks.\par \par This document was electronically signed by: Opal Jones MD
declines

## 2023-04-24 RX ORDER — GALCANEZUMAB 120 MG/ML
INJECTION, SOLUTION SUBCUTANEOUS
Qty: 1 | Refills: 1 | Status: ACTIVE | COMMUNITY
Start: 2022-12-27 | End: 1900-01-01

## 2023-06-01 ENCOUNTER — APPOINTMENT (OUTPATIENT)
Dept: NEUROLOGY | Facility: CLINIC | Age: 63
End: 2023-06-01

## 2023-07-17 ENCOUNTER — RX RENEWAL (OUTPATIENT)
Age: 63
End: 2023-07-17

## 2023-07-20 ENCOUNTER — NON-APPOINTMENT (OUTPATIENT)
Age: 63
End: 2023-07-20

## 2023-07-24 ENCOUNTER — APPOINTMENT (OUTPATIENT)
Dept: NEUROLOGY | Facility: CLINIC | Age: 63
End: 2023-07-24

## 2023-07-31 ENCOUNTER — APPOINTMENT (OUTPATIENT)
Dept: NEUROLOGY | Facility: CLINIC | Age: 63
End: 2023-07-31

## 2023-09-06 ENCOUNTER — APPOINTMENT (OUTPATIENT)
Dept: NEUROLOGY | Facility: CLINIC | Age: 63
End: 2023-09-06

## 2023-09-11 ENCOUNTER — APPOINTMENT (OUTPATIENT)
Dept: NEUROLOGY | Facility: CLINIC | Age: 63
End: 2023-09-11

## 2023-09-11 ENCOUNTER — APPOINTMENT (OUTPATIENT)
Dept: NEUROLOGY | Facility: CLINIC | Age: 63
End: 2023-09-11
Payer: MEDICAID

## 2023-09-11 VITALS — DIASTOLIC BLOOD PRESSURE: 99 MMHG | SYSTOLIC BLOOD PRESSURE: 137 MMHG | HEIGHT: 65 IN

## 2023-09-11 DIAGNOSIS — F41.0 PANIC DISORDER [EPISODIC PAROXYSMAL ANXIETY]: ICD-10-CM

## 2023-09-11 PROCEDURE — 99214 OFFICE O/P EST MOD 30 MIN: CPT

## 2023-09-11 RX ORDER — RIMEGEPANT SULFATE 75 MG/75MG
75 TABLET, ORALLY DISINTEGRATING ORAL
Qty: 16 | Refills: 3 | Status: ACTIVE | COMMUNITY
Start: 2022-12-27 | End: 1900-01-01

## 2023-09-11 RX ORDER — GALCANEZUMAB 120 MG/ML
120 INJECTION, SOLUTION SUBCUTANEOUS
Qty: 1 | Refills: 5 | Status: ACTIVE | COMMUNITY
Start: 2022-12-27 | End: 1900-01-01

## 2023-09-11 RX ORDER — DIAZEPAM 2 MG/1
2 TABLET ORAL EVERY 8 HOURS
Qty: 14 | Refills: 0 | Status: ACTIVE | COMMUNITY
Start: 2023-09-11 | End: 1900-01-01

## 2023-09-12 ENCOUNTER — APPOINTMENT (OUTPATIENT)
Dept: NEUROLOGY | Facility: CLINIC | Age: 63
End: 2023-09-12
Payer: MEDICAID

## 2023-10-31 ENCOUNTER — APPOINTMENT (OUTPATIENT)
Dept: NEUROLOGY | Facility: CLINIC | Age: 63
End: 2023-10-31

## 2023-11-27 ENCOUNTER — RX RENEWAL (OUTPATIENT)
Age: 63
End: 2023-11-27

## 2023-11-28 DIAGNOSIS — G43.109 MIGRAINE WITH AURA, NOT INTRACTABLE, W/OUT STATUS MIGRAINOSUS: ICD-10-CM

## 2023-11-28 RX ORDER — SUMATRIPTAN 100 MG/1
100 TABLET, FILM COATED ORAL
Qty: 30 | Refills: 0 | Status: ACTIVE | COMMUNITY
Start: 2023-11-28 | End: 1900-01-01

## 2024-03-05 ENCOUNTER — RX RENEWAL (OUTPATIENT)
Age: 64
End: 2024-03-05

## 2024-04-20 ENCOUNTER — RX RENEWAL (OUTPATIENT)
Age: 64
End: 2024-04-20

## 2024-04-23 ENCOUNTER — NON-APPOINTMENT (OUTPATIENT)
Age: 64
End: 2024-04-23

## 2024-08-02 NOTE — ED ADULT NURSE NOTE - NEURO GAIT
on the discharge service for the patient. I have reviewed and made amendments to the documentation where necessary.
steady

## 2025-07-07 NOTE — ED ADULT TRIAGE NOTE - ARRIVAL FROM
"Patient is currently admitted to a SNF/Rehabilitation center until further notice. Will follow up with the patient in _____ weeks. Removed from HFU list and added to \"remind me\" task for follow-up    "
Home

## 2025-07-08 NOTE — ED ADULT NURSE NOTE - CAS EDN DISCHARGE INTERVENTIONS
[Time Spent: ____ minutes] : Total time spent using  services: [unfilled] minutes. The patient's primary language is not English thus required  services. [Interpreters_IDNumber] : [id number] [Interpreters_FullName] : Matthwe [TWNoteComboBox1] : Filipino IV discontinued, cath removed intact
